# Patient Record
Sex: FEMALE | Race: BLACK OR AFRICAN AMERICAN | NOT HISPANIC OR LATINO | Employment: FULL TIME | URBAN - METROPOLITAN AREA
[De-identification: names, ages, dates, MRNs, and addresses within clinical notes are randomized per-mention and may not be internally consistent; named-entity substitution may affect disease eponyms.]

---

## 2017-04-18 ENCOUNTER — HOSPITAL ENCOUNTER (EMERGENCY)
Facility: OTHER | Age: 37
Discharge: HOME | End: 2017-04-19
Attending: EMERGENCY MEDICINE
Payer: COMMERCIAL

## 2017-04-18 VITALS
SYSTOLIC BLOOD PRESSURE: 141 MMHG | HEART RATE: 90 BPM | DIASTOLIC BLOOD PRESSURE: 83 MMHG | WEIGHT: 237.88 LBS | BODY MASS INDEX: 38.23 KG/M2 | TEMPERATURE: 98 F | OXYGEN SATURATION: 96 % | RESPIRATION RATE: 20 BRPM | HEIGHT: 66 IN

## 2017-04-18 DIAGNOSIS — J02.9 PHARYNGITIS, UNSPECIFIED ETIOLOGY: Primary | ICD-10-CM

## 2017-04-18 LAB
B-HCG UR QL: NEGATIVE
CTP QC/QA: YES
DEPRECATED S PYO AG THROAT QL EIA: NEGATIVE

## 2017-04-18 PROCEDURE — 87081 CULTURE SCREEN ONLY: CPT

## 2017-04-18 PROCEDURE — 81025 URINE PREGNANCY TEST: CPT | Performed by: EMERGENCY MEDICINE

## 2017-04-18 PROCEDURE — 96372 THER/PROPH/DIAG INJ SC/IM: CPT

## 2017-04-18 PROCEDURE — 87880 STREP A ASSAY W/OPTIC: CPT

## 2017-04-18 PROCEDURE — 99283 EMERGENCY DEPT VISIT LOW MDM: CPT | Mod: 25

## 2017-04-18 PROCEDURE — 63600175 PHARM REV CODE 636 W HCPCS: Performed by: PHYSICIAN ASSISTANT

## 2017-04-18 RX ORDER — DEXAMETHASONE SODIUM PHOSPHATE 4 MG/ML
8 INJECTION, SOLUTION INTRA-ARTICULAR; INTRALESIONAL; INTRAMUSCULAR; INTRAVENOUS; SOFT TISSUE
Status: COMPLETED | OUTPATIENT
Start: 2017-04-18 | End: 2017-04-18

## 2017-04-18 RX ORDER — IBUPROFEN 800 MG/1
800 TABLET ORAL EVERY 6 HOURS PRN
Qty: 20 TABLET | Refills: 0 | Status: SHIPPED | OUTPATIENT
Start: 2017-04-18 | End: 2018-03-08

## 2017-04-18 RX ORDER — LISINOPRIL 40 MG/1
40 TABLET ORAL DAILY
COMMUNITY

## 2017-04-18 RX ADMIN — DEXAMETHASONE SODIUM PHOSPHATE 8 MG: 4 INJECTION, SOLUTION INTRAMUSCULAR; INTRAVENOUS at 11:04

## 2017-04-18 NOTE — ED AVS SNAPSHOT
[facility]  [address] [facility] [facility] [address]               Fabienne Santiago   2017 10:53 PM   ED    Description:  Female : 1980   Department:  [facility]           Your Care was Coordinated By:     Provider Role From To    Vannesa Collins MD Attending Provider [phone] --    Shahla Luque PA-C Physician Assistant 17 [phone] --      Reason for Visit     Neck Pain           Diagnoses this Visit        Comments    Pharyngitis, unspecified etiology    -  Primary       ED Disposition     None           To Do List           Follow-up Information     Follow up with Bryce Goodson MD In 2 days.    Specialty:  Family Medicine    Contact information:    [address]  [facility] [id number] [phone]          Follow up with [facility].    Specialty:  Emergency Medicine    Why:  If symptoms worsen    Contact information:    [address] [facility] [facility] [address] [phone]       These Medications        Disp Refills Start End    ibuprofen (ADVIL,MOTRIN) 800 MG tablet 20 tablet 0 2017     Take 1 tablet (800 mg total) by mouth every 6 (six) hours as needed for Pain. - Oral      [facility]     [facility] Nurse Care Line -  Assistance  Unless otherwise directed by your provider, please contact [facility], our nurse care line that is available for  assistance.     Registered nurses in the [facility] provide: appointment scheduling, clinical advisement, health education, and other advisory services.  Call: [phone] (toll free)               Medications           START taking these NEW medications        Refills    ibuprofen (ADVIL,MOTRIN) 800 MG tablet 0    Sig: Take 1 tablet (800 mg total) by mouth every 6 (six) hours as needed for Pain.    Class: Print    Route: Oral      These medications were administered today        Dose Freq     "dexamethasone injection 8 mg 8 mg ED 1 Time    Sig: Inject 2 mLs (8 mg total) into the muscle ED 1 Time.    Class: Normal    Route: Intramuscular      STOP taking these medications     diphenhydrAMINE (SOMINEX) 25 mg tablet Take 25 mg by mouth nightly as needed for Insomnia.           Verify that the below list of medications is an accurate representation of the medications you are currently taking.  If none reported, the list may be blank. If incorrect, please contact your healthcare provider. Carry this list with you in case of emergency.           Current Medications     lisinopril (PRINIVIL,ZESTRIL) 40 MG tablet Take 40 mg by mouth once daily.    ibuprofen (ADVIL,MOTRIN) 800 MG tablet Take 1 tablet (800 mg total) by mouth every 6 (six) hours as needed for Pain.           Clinical Reference Information           Your Vitals Were     BP Pulse Temp Resp Height Weight    141/83 (BP Location: Left arm, Patient Position: Sitting) 90 98.1 °F (36.7 °C) (Oral) 20 5' 6" (1.676 m) 107.9 kg (237 lb 14 oz)    Last Period SpO2 BMI          04/17/2017 96% 38.39 kg/m2        Allergies as of 4/18/2017     No Known Allergies      Immunizations Administered on Date of Encounter - 4/18/2017     None      ED Micro, Lab, POCT     Start Ordered       Status Ordering Provider    04/18/17 2309 04/18/17 2308  Rapid strep screen  STAT      Final result     04/18/17 2309 04/18/17 2309  Strep A culture, throat  Once      In process     04/18/17 2254 04/18/17 2253  POCT urine pregnancy  Once      Final result       ED Imaging Orders     None      Discharge References/Attachments     SORE THROATS, SELF-CARE FOR (ENGLISH)    SORE THROAT, WHEN YOU HAVE A (ENGLISH)    PHARYNGITIS, VIRAL (ENGLISH)      [facility] Sign-Up     Activating your [facility] account is as easy as 1-2-3!     1) Visit [email or web address], select Sign Up Now, enter this activation code and your date of birth, then select Next.  [email or web address]  Expires: 6/2/2017 11:39 PM  "     2) Create a username and password to use when you visit [facility] in the future and select a security question in case you lose your password and select Next.    3) Enter your e-mail address and click Sign Up!    Additional Information  If you have questions, please e-mail [email or web address] or call [phone] to talk to our [facility] staff. Remember, [facility] is NOT to be used for urgent needs. For medical emergencies, dial 911.         Smoking Cessation     If you would like to quit smoking:   You may be eligible for free services if you are a [place] resident and started smoking cigarettes before September 1, 1988.  Call the Smoking Cessation Trust ([facility]) toll free at [phone] or ([phone] [phone].   Call 5-800-QUIT-NOW if you do not meet the above criteria.   Contact us via email: [email or web address]   View our website for more information: [email or web address]         [facility] complies with applicable Federal civil rights laws and does not discriminate on the basis of race, color, national origin, age, disability, or sex.        Language Assistance Services     ATTENTION: Language assistance services are available, free of charge. Please call [phone].      ATENCIÓN: Si habla español, tiene a phillips disposición servicios gratuitos de asistencia lingüística. Llame al [phone].     CHÚ Ý: N?u b?n nói Ti?ng Vi?t, có các d?ch v? h? tr? ngôn ng? mi?n phí dành cho b?n. G?i s? [phone].

## 2017-04-19 NOTE — ED PROVIDER NOTES
Encounter Date: 2017       History     Chief Complaint   Patient presents with    Neck Pain     front and sides since yesterday, discomfort when swallowing, earaches 5 days ago     Review of patient's allergies indicates:  No Known Allergies  HPI Comments: Patient is a 36 y.o. female with a past medical history of hypertension, presenting to the emergency department with complaints of neck and rib pain.  The patient reports her symptoms started yesterday and have been persistent since.  She states her pain worsens with swallowing and movement.  She denies difficulty swallowing or handling oral secretions, denies fever, chills, nausea, vomiting.  She reports she has tried taking ibuprofen 800 mg, for septic, and spray for her symptoms with no relief.  She states her pain as a 7/10.  She also reports that she has been treated for an otitis externa.     The history is provided by the patient.     Past Medical History:   Diagnosis Date    Hypertension      Past Surgical History:   Procedure Laterality Date     SECTION, CLASSIC      TONSILLECTOMY       History reviewed. No pertinent family history.  Social History   Substance Use Topics    Smoking status: Current Every Day Smoker     Packs/day: 1.00     Types: Cigarettes    Smokeless tobacco: None    Alcohol use Yes      Comment: occas     Review of Systems   Constitutional: Negative for activity change, appetite change, chills, fatigue and fever.   HENT: Positive for sore throat. Negative for congestion and rhinorrhea.    Eyes: Negative for photophobia and visual disturbance.   Respiratory: Negative for cough, shortness of breath and wheezing.    Cardiovascular: Negative for chest pain.   Gastrointestinal: Negative for abdominal pain, diarrhea, nausea and vomiting.   Genitourinary: Negative for dysuria, hematuria and urgency.   Musculoskeletal: Positive for neck pain. Negative for back pain and myalgias.   Skin: Negative for color change and wound.    Neurological: Negative for weakness and headaches.   Psychiatric/Behavioral: Negative for agitation and confusion.       Physical Exam   Initial Vitals   BP Pulse Resp Temp SpO2   04/18/17 2250 04/18/17 2250 04/18/17 2250 04/18/17 2250 04/18/17 2250   141/83 90 20 98.1 °F (36.7 °C) 96 %     Physical Exam    Nursing note and vitals reviewed.  Constitutional: She appears well-developed and well-nourished. She is not diaphoretic.  Non-toxic appearance. She does not have a sickly appearance. She does not appear ill. No distress.   Well appearing, -American female accompanied by her son in the emergency department.  She is speaking in clear and full sentences, moving all studies.   HENT:   Head: Normocephalic and atraumatic.   Right Ear: External ear normal.   Left Ear: External ear normal.   Nose: Nose normal.   Mouth/Throat: Uvula is midline, oropharynx is clear and moist and mucous membranes are normal. No trismus in the jaw. No uvula swelling.   Submandibular tenderness to palpation with no edema, no lingual elevation, no trismus.  Swallowing and handling oral secretions without difficulty.  Speaking in clear and full sentences.   Eyes: Conjunctivae and EOM are normal.   Neck: Normal range of motion and full passive range of motion without pain. Neck supple.       Cardiovascular: Normal rate, regular rhythm and normal heart sounds.   Pulmonary/Chest: Breath sounds normal. No respiratory distress. She has no wheezes.   Musculoskeletal: Normal range of motion.   Neurological: She is alert and oriented to person, place, and time. GCS eye subscore is 4. GCS verbal subscore is 5. GCS motor subscore is 6.   Skin: Skin is warm.   Psychiatric: She has a normal mood and affect. Her behavior is normal. Judgment and thought content normal.         ED Course   Procedures  Labs Reviewed   THROAT SCREEN, RAPID   CULTURE, STREP A,  THROAT   POCT URINE PREGNANCY             Medical Decision Making:   Clinical Tests:   Lab  Tests: Ordered and Reviewed       <> Summary of Lab: Rapid strep  Other:   I have discussed this case with another health care provider.       <> Summary of the Discussion: Dr. Collins  This note was created using Dragon Medical Dictation. There may be typographical errors secondary to dictation.     Urgent evaluation of a 36 y.o. female with a past medical history of HTN, presenting to the emergency department complaining of neck and throat pain x2 days. Patient is afebrile, nontoxic appearing and hemodynamically stable.  Physical exam reveals regular rate and rhythm, lungs are clear to auscultation bilaterally.  No posterior oropharyngeal erythema or edema.  No submandibular edema, tenderness noted.  No lingual elevation or trismus.  The patient has normal phonation with no trismus to suggest retropharyngeal abscess. There is no hoarseness, difficulty handling secretions, facial swelling, or meningismus to suggest peritonsillar abscess, retropharyngeal abscess, epiglottitis, meningitis, or airway compromise. Will obtain rapid strep, decadron and reassess.  Rapid strep is negative. Patient will be discharged home with a prescription for ibuprofen, counseled on further symptomatic care and treatment. The patient was instructed to follow up with a primary care provider in 2 days or to return to the emergency department for worsening symptoms. The treatment plan was discussed with the patient who demonstrated understanding and comfort with plan. The patient's history, physical exam, and plan of care was discussed with and agreed upon with my supervising physician.     Past Medical History:   Diagnosis Date    Hypertension                      ED Course     Clinical Impression:     1. Pharyngitis, unspecified etiology       Disposition:   Disposition: Discharged  Condition: Stable       Shahla Luque PA-C  04/18/17 3165

## 2017-04-19 NOTE — ED NOTES
Pt c/o neck pain, the front and sides x 2 days. Pt is taking abx for earaches. NO trauma. Pt states she has some discomfort when swallowing. NO trauma. Pt is A & O x 3, denies SOB, fever, chills and N/V/D. Skin is warm and dry w/ pink mucosa. VSS. LONNIE x 3mm. BBS- CTA. Abd- SNT. PSM x 4 exts.

## 2017-04-21 LAB — BACTERIA THROAT CULT: NORMAL

## 2018-03-08 ENCOUNTER — HOSPITAL ENCOUNTER (EMERGENCY)
Facility: OTHER | Age: 38
Discharge: HOME | End: 2018-03-08
Attending: EMERGENCY MEDICINE
Payer: COMMERCIAL

## 2018-03-08 VITALS
WEIGHT: 227 LBS | SYSTOLIC BLOOD PRESSURE: 147 MMHG | TEMPERATURE: 98 F | HEIGHT: 66 IN | BODY MASS INDEX: 36.48 KG/M2 | OXYGEN SATURATION: 99 % | HEART RATE: 80 BPM | RESPIRATION RATE: 15 BRPM | DIASTOLIC BLOOD PRESSURE: 77 MMHG

## 2018-03-08 DIAGNOSIS — T14.90XA INJURY: ICD-10-CM

## 2018-03-08 DIAGNOSIS — S93.601A RIGHT FOOT SPRAIN, INITIAL ENCOUNTER: Primary | ICD-10-CM

## 2018-03-08 LAB
B-HCG UR QL: NEGATIVE
CTP QC/QA: YES

## 2018-03-08 PROCEDURE — 81025 URINE PREGNANCY TEST: CPT | Performed by: EMERGENCY MEDICINE

## 2018-03-08 PROCEDURE — 99284 EMERGENCY DEPT VISIT MOD MDM: CPT | Mod: 25

## 2018-03-08 RX ORDER — IBUPROFEN 800 MG/1
800 TABLET ORAL EVERY 6 HOURS PRN
Qty: 30 TABLET | Refills: 0 | OUTPATIENT
Start: 2018-03-08 | End: 2018-11-06

## 2018-03-08 RX ORDER — METHOCARBAMOL 500 MG/1
1000 TABLET, FILM COATED ORAL 3 TIMES DAILY PRN
Qty: 20 TABLET | Refills: 0 | Status: SHIPPED | OUTPATIENT
Start: 2018-03-08 | End: 2018-03-13

## 2018-03-08 NOTE — ED TRIAGE NOTES
Pt c/o right lateral foot pain since yesterday. She denies any trauma. Also denies taking any meds for the pain

## 2018-03-08 NOTE — ED NOTES
Patient Identifiers for Fabienne Santiago checked and correct  LOC: The patient is awake, alert and aware of environment with an appropriate affect, the patient is oriented x 3 and speaking appropriate.  APPEARANCE: Patient resting comfortably and in no acute distress. The patient is clean and well groomed. The patient's clothing is properly fastened.  SKIN: The skin is warm and dry. The patient has normal skin turgor and moist mucus membranes. No rashes or lesions upon observation. Skin Intact , no breakdown noted.  Musculoskeletal :  Normal range of motion noted. Moves all extremities well, right side foot pain  RESPIRATORY: Airway is open and patent, respirations are spontaneous, patient has a normal effort and rate. Breath sounds are clear & equal, bilaterally.  CARDIAC: Patient has a normal rate and rhythm, no peripheral edema noted, capillary refill < 3 seconds.   ABDOMEN: Soft and non tender to palpation, no distention observed. Bowels Sounds are WNL all quads.  PULSES: 2+ radial & pedal pulses, symmetrical in all extremities.  NEUROLOGIC: PERRL, Pupils 3mm and reacts briskly to light. Motor strength 5/5 all extremities.  The pt's facial expression is symmetrical, patient moving all extremities, normal sensation in all extremities when touched with a finger.The patient is awake, alert and cooperative with a calm affect, patient is aware of environment.      Will continue to monitor

## 2018-03-08 NOTE — ED PROVIDER NOTES
"Encounter Date: 3/8/2018    SCRIBE #1 NOTE: I, Christal Nevarez, am scribing for, and in the presence of, Dr. John.       History     Chief Complaint   Patient presents with    Foot Pain     pt complaining of right foot pain.  denies any trauma.  no swelling noted.       Time seen by provider: 5:35 PM    This is a 37 y.o. female, with history of hypertension, who presents with complaint of right foot pain which started a couple of days ago. Patient describes foot pain as constant and located on the lateral surface of the foot. She denies numbness, weakness, tingling, or ankle pain. She denies direct trauma to the foot but notes that one day prior to onset of pain someone stepped on the back of her shoe, causing her body to "lean forward" while her foot was in her shoe. Foot pain is exacerbated with ambulation. She states that foot pain was unchanged by pain medication she is currently taking for a "shoulder problem." Patient has no additional complaints at this time.      The history is provided by the patient.     Review of patient's allergies indicates:  No Known Allergies  Past Medical History:   Diagnosis Date    Hypertension      Past Surgical History:   Procedure Laterality Date     SECTION, CLASSIC      TONSILLECTOMY       No family history on file.  Social History   Substance Use Topics    Smoking status: Current Every Day Smoker     Packs/day: 1.00     Types: Cigarettes    Smokeless tobacco: Not on file    Alcohol use Yes      Comment: occas     Review of Systems   Constitutional: Negative for chills and fever.   HENT: Negative for congestion and sore throat.    Respiratory: Negative for cough and shortness of breath.    Cardiovascular: Negative for chest pain.   Gastrointestinal: Negative for abdominal pain, constipation, diarrhea, nausea and vomiting.   Genitourinary: Negative for dysuria.   Musculoskeletal: Negative for back pain.        Positive for right foot pain.   Skin: Negative for rash " and wound.   Neurological: Negative for weakness and numbness.   Hematological: Does not bruise/bleed easily.       Physical Exam     Initial Vitals [03/08/18 1714]   BP Pulse Resp Temp SpO2   (!) 141/79 99 15 98.8 °F (37.1 °C) 99 %      MAP       99.67         Physical Exam    Nursing note and vitals reviewed.  Constitutional: She appears well-developed and well-nourished. She is not diaphoretic. No distress.   HENT:   Head: Normocephalic and atraumatic.   Eyes: EOM are normal. Pupils are equal, round, and reactive to light. Right eye exhibits no discharge. Left eye exhibits no discharge. No scleral icterus.   No pallor or scleral icterus.   Neck: Normal range of motion. Neck supple.   Cardiovascular: Intact distal pulses.   Pulmonary/Chest: No respiratory distress.   Musculoskeletal: Normal range of motion. She exhibits tenderness. She exhibits no edema.   Tenderness to palpation to the right lateral foot over region of proximal 5th metatarsal. No tenderness to malleoli, Carolin's tendon, plantar region, or proximal fibula.    Neurological: She is alert and oriented to person, place, and time.   NVID.   Skin: Skin is warm and dry. Capillary refill takes less than 2 seconds. No rash and no abscess noted. No erythema. No pallor.   Psychiatric: She has a normal mood and affect. Her behavior is normal. Judgment and thought content normal.         ED Course   Procedures  Labs Reviewed   POCT URINE PREGNANCY     Imaging Results          X-Ray Foot Complete Right (Final result)  Result time 03/08/18 18:20:35    Final result by Issac Sykes MD (03/08/18 18:20:35)                 Impression:       No acute process.              Electronically signed by: ISSAC SYKES MD  Date:     03/08/18  Time:    18:20              Narrative:    Exam: [id number]  03/08/18  17:52:08 [id number] ([facility]) : XR FOOT COMPLETE 3 VIEW RIGHT    Technique:    3 x-ray views of the right foot.    Comparison:    02/22/2016    Findings:      The bone  mineralization is within normal limits.  The joint spaces are maintained.  The Lisfranc articulation is within normal limits.  The soft tissues are unremarkable.  There is no evidence of a fracture or dislocation of the right foot.                                   X-Rays:   Independently Interpreted Readings:   Other Readings:  R foot- No fracture or dislocation.    Medical Decision Making:   Independently Interpreted Test(s):   I have ordered and independently interpreted X-rays - see prior notes.  Clinical Tests:   Lab Tests: Ordered and Reviewed  Radiological Study: Ordered and Reviewed            Scribe Attestation:   Scribe #1: I performed the above scribed service and the documentation accurately describes the services I performed. I attest to the accuracy of the note.    Attending Attestation:           Physician Attestation for Scribe:  Physician Attestation Statement for Scribe #1: I, Dr. John, reviewed documentation, as scribed by Christal Nevarez in my presence, and it is both accurate and complete.          patient presents complaining of a genetic right foot pain.  She does a lot of walking she also reports catching her foot on something and hyperextending and in the plantar direction.  Did not think much of it.  No other trauma.  On exam she has tenderness over the fifth metatarsal x-ray however does not show fracture in this region or otherwise will treat with anti-inflammatory, muscle relaxant.  The patient relates that she had similar symptoms and received relief with a intramuscular shot several years ago chart review shows this was anti-inflammatory and Decadron however patient is already on steroid pack for her shoulder.  Stable for discharge.  Encouraged follow-up with primary care and/or/her orthopedist, especially if symptoms persist.           Clinical Impression:     1. Right foot sprain, initial encounter    2. Injury                                 Calderon John II, MD  03/09/18 1300

## 2018-11-06 ENCOUNTER — HOSPITAL ENCOUNTER (EMERGENCY)
Facility: OTHER | Age: 38
Discharge: HOME | End: 2018-11-06
Attending: EMERGENCY MEDICINE
Payer: COMMERCIAL

## 2018-11-06 VITALS
OXYGEN SATURATION: 99 % | HEART RATE: 82 BPM | HEIGHT: 67 IN | RESPIRATION RATE: 17 BRPM | WEIGHT: 227 LBS | BODY MASS INDEX: 35.63 KG/M2 | DIASTOLIC BLOOD PRESSURE: 79 MMHG | SYSTOLIC BLOOD PRESSURE: 129 MMHG | TEMPERATURE: 98 F

## 2018-11-06 DIAGNOSIS — R06.02 SOB (SHORTNESS OF BREATH): ICD-10-CM

## 2018-11-06 DIAGNOSIS — M54.9 MID BACK PAIN ON LEFT SIDE: Primary | ICD-10-CM

## 2018-11-06 DIAGNOSIS — Z72.0 TOBACCO USE: ICD-10-CM

## 2018-11-06 LAB
B-HCG UR QL: NEGATIVE
BILIRUB UR QL STRIP: NEGATIVE
CLARITY UR: CLEAR
COLOR UR: YELLOW
CTP QC/QA: YES
GLUCOSE UR QL STRIP: NEGATIVE
HGB UR QL STRIP: NEGATIVE
KETONES UR QL STRIP: NEGATIVE
LEUKOCYTE ESTERASE UR QL STRIP: NEGATIVE
NITRITE UR QL STRIP: NEGATIVE
PH UR STRIP: 6 [PH] (ref 5–8)
PROT UR QL STRIP: NEGATIVE
SP GR UR STRIP: 1.01 (ref 1–1.03)
URN SPEC COLLECT METH UR: NORMAL
UROBILINOGEN UR STRIP-ACNC: NEGATIVE EU/DL

## 2018-11-06 PROCEDURE — 81025 URINE PREGNANCY TEST: CPT | Performed by: EMERGENCY MEDICINE

## 2018-11-06 PROCEDURE — 99284 EMERGENCY DEPT VISIT MOD MDM: CPT | Mod: 25

## 2018-11-06 PROCEDURE — 96372 THER/PROPH/DIAG INJ SC/IM: CPT

## 2018-11-06 PROCEDURE — 25000003 PHARM REV CODE 250: Performed by: PHYSICIAN ASSISTANT

## 2018-11-06 PROCEDURE — 63600175 PHARM REV CODE 636 W HCPCS: Performed by: PHYSICIAN ASSISTANT

## 2018-11-06 PROCEDURE — 81003 URINALYSIS AUTO W/O SCOPE: CPT

## 2018-11-06 RX ORDER — OXAPROZIN 600 MG/1
600 TABLET, FILM COATED ORAL 2 TIMES DAILY PRN
Qty: 20 TABLET | Refills: 0 | Status: SHIPPED | OUTPATIENT
Start: 2018-11-06

## 2018-11-06 RX ORDER — VALACYCLOVIR HYDROCHLORIDE 500 MG/1
500 TABLET, FILM COATED ORAL 2 TIMES DAILY
COMMUNITY

## 2018-11-06 RX ORDER — CYCLOBENZAPRINE HCL 10 MG
10 TABLET ORAL 3 TIMES DAILY PRN
Qty: 15 TABLET | Refills: 0 | Status: SHIPPED | OUTPATIENT
Start: 2018-11-06 | End: 2018-11-11

## 2018-11-06 RX ORDER — KETOROLAC TROMETHAMINE 30 MG/ML
30 INJECTION, SOLUTION INTRAMUSCULAR; INTRAVENOUS
Status: COMPLETED | OUTPATIENT
Start: 2018-11-06 | End: 2018-11-06

## 2018-11-06 RX ORDER — CYCLOBENZAPRINE HCL 10 MG
10 TABLET ORAL
Status: COMPLETED | OUTPATIENT
Start: 2018-11-06 | End: 2018-11-06

## 2018-11-06 RX ADMIN — CYCLOBENZAPRINE HYDROCHLORIDE 10 MG: 10 TABLET, FILM COATED ORAL at 03:11

## 2018-11-06 RX ADMIN — KETOROLAC TROMETHAMINE 30 MG: 30 INJECTION, SOLUTION INTRAMUSCULAR at 03:11

## 2018-11-06 NOTE — ED TRIAGE NOTES
"Pt reports L sided back pain near her ribs starting Sunday evening. "When the pain comes, my chest will feel like it's going in and it's hard to breathe." Denies any recent injury r heavy lifting. Pt also reports increased belching.   "

## 2018-11-06 NOTE — ED PROVIDER NOTES
Encounter Date: 2018       History     Chief Complaint   Patient presents with    rib pain     Pt c/o right mid back and rib pain that worsens with inspiration and with exertion. Pt states she feels she is unable to take a deep breath because of the pain. Denies cough.      38-year-old female who is a current everyday smoker with history of hypertension presents emergency department with complaints of left-sided back/flank pain. She states that been present since .  She denies any known trauma or injury, fever chills.  She does report chronic cough secondary to smoking denies any chest pain or shortness of breath.  She denies any urinary symptoms.  She reports the pain is worse when she takes a deep breath. She states the pain is an 8/10.  No current treatment at home.  She does report that she had a history of a shoulder injury earlier this year.  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  She states that this was done in July. She states that she is unsure if this pain is secondary to muscle spasm or related to this chronic issue.      The history is provided by the patient.     Review of patient's allergies indicates:  No Known Allergies  Past Medical History:   Diagnosis Date    Hypertension      Past Surgical History:   Procedure Laterality Date     SECTION, CLASSIC      TONSILLECTOMY       No family history on file.  Social History     Tobacco Use    Smoking status: Current Every Day Smoker     Packs/day: 1.00     Types: Cigarettes   Substance Use Topics    Alcohol use: Yes     Comment: occas    Drug use: No     Review of Systems   Constitutional: Negative for chills and fever.   HENT: Negative for sore throat.    Respiratory: Positive for cough (chronic). Negative for  chest tightness, shortness of breath and wheezing.    Cardiovascular: Negative for chest pain, palpitations and leg swelling.   Gastrointestinal: Negative for nausea and vomiting.   Genitourinary: Positive for flank pain. Negative for difficulty urinating, dysuria, frequency, hematuria and urgency.   Musculoskeletal: Positive for back pain. Negative for neck pain and neck stiffness.   Skin: Negative for rash.   Neurological: Negative for weakness and numbness.   Hematological: Does not bruise/bleed easily.       Physical Exam     Initial Vitals [11/06/18 1251]   BP Pulse Resp Temp SpO2   135/80 82 18 98.2 °F (36.8 °C) 98 %      MAP       --         Physical Exam    Nursing note and vitals reviewed.  Constitutional: Vital signs are normal. She appears well-developed and well-nourished. She is not diaphoretic.  Non-toxic appearance. No distress.   HENT:   Head: Normocephalic and atraumatic.   Right Ear: External ear normal.   Left Ear: External ear normal.   Nose: Nose normal.   Mouth/Throat: Oropharynx is clear and moist.   Eyes: Conjunctivae, EOM and lids are normal. Pupils are equal, round, and reactive to light. No scleral icterus.   Neck: Normal range of motion and phonation normal. Neck supple. No spinous process tenderness and no muscular tenderness present. Normal range of motion present. No neck rigidity.   Cardiovascular: Normal rate, regular rhythm, normal heart sounds and intact distal pulses. Exam reveals no gallop and no friction rub.    No murmur heard.  Pulmonary/Chest: Effort normal and breath sounds normal. No respiratory distress. She has no decreased breath sounds. She has no wheezes. She has no rhonchi. She has no rales. She exhibits no tenderness.   Abdominal: Normal appearance. There is no CVA tenderness.   Musculoskeletal: Normal range of motion.        Arms:  No obvious deformities, moving all extremities, normal gait  No midline TTP or step offs to cervical, thoracic or lumbar spine. No  paraspinal muscle TTP. FROM of spine without discomfort or pain. No signs of trauma or injury.   Pain is not reproducible with palpation. She does report worsening pain when she goes from lying to sitting the.  She does report pain also when taking a deep breath. No rashes. Strength 5/5 bilaterally UEs and LEs   Neurological: She is alert and oriented to person, place, and time. She displays no atrophy. No sensory deficit. She exhibits normal muscle tone. GCS eye subscore is 4. GCS verbal subscore is 5. GCS motor subscore is 6.   Skin: Skin is warm, dry and intact. No abrasion, no bruising, no ecchymosis, no laceration, no lesion and no rash noted. No erythema.   Psychiatric: She has a normal mood and affect. Her speech is normal and behavior is normal. Judgment normal. Cognition and memory are normal.         ED Course   Procedures  Labs Reviewed   URINALYSIS, REFLEX TO URINE CULTURE    Narrative:     Preferred Collection Type->Urine, Clean Catch   POCT URINE PREGNANCY          Imaging Results          X-Ray Chest PA And Lateral (Final result)  Result time 11/06/18 14:54:02    Final result by Justice Santiago MD (11/06/18 14:54:02)                 Impression:      No acute abnormality.      Electronically signed by: Justice Santiago MD  Date:    11/06/2018  Time:    14:54             Narrative:    EXAMINATION:  XR CHEST PA AND LATERAL    CLINICAL HISTORY:  Shortness of breath    TECHNIQUE:  PA and lateral views of the chest were performed.    COMPARISON:  None    FINDINGS:  The lungs are clear, with normal appearance of pulmonary vasculature and no pleural effusion or pneumothorax.    The cardiac silhouette is normal in size. The hilar and mediastinal contours are unremarkable.    Bones are intact.                                 Medical Decision Making:   History:   Old Medical Records: I decided to obtain old medical records.  Initial Assessment:   38-year-old female with complaints consistent with left-sided  mid back pain. Vital signs stable, afebrile, neurovascularly intact. She is alert and healthy and nontoxic appearing.  She is not distressed.  Exam documented above.  Patient has worsening pain when she moves from lying to sitting however does not reproducible on my exam.  There is no CVA tenderness palpation. Her lungs are clear to auscultation.  She does report being everyday smoker with a chronic cough but denies any new symptoms. She is PERC negative.   Clinical Tests:   Lab Tests: Ordered and Reviewed  Radiological Study: Ordered and Reviewed  ED Management:  UPT, urinalysis and chest x-ray obtained.  UPT is negative. Urinalysis shows no evidence of serious bacterial infection, UTI or pyelonephritis.  Chest x-ray shows no evidence of infiltrate, pleural effusion, mass consolidation, lesion or pneumothorax.  Patient's pain is to the left mid back.  It is not reproducible with palpation.  She does report that is worse when she takes a deep breath. She was administered IM Toradol in the emergency department.  I did discuss results with patient.  She is tearful in stating that she is crying secondary to her pain.  4:00 PM patient was administered PO flexeril in the ED. She stated to the nurse that she was ready to be discharged home because she wanted to go to [facility] for an MRI. She stated to the nurse that the flexeril would not do anything. Emergent MRI not indicated at this time based on history and exam. There is no evidence of spinal cord compression or cauda equina syndrome. Will discharge home with Rx Oxaprozin and Flexeril as well as care instructions.  She is urged to discontinue tobacco use.  She is urged close follow up with her PCP in the next 48 hours or return to the ED for any worsening signs or symptoms  Other:   I have discussed this case with another health care provider.       <> Summary of the Discussion: Karina  This note was created using [employer] dictation.  There may  be typographical errors secondary to dictation.      Additional MDM:   Smoking Cessation: The patient is a smoker. The patient was counseled on smoking cessation for: 3 minutes. The patient was counseled on tobacco related  health complications.                    Clinical Impression:     1. Mid back pain on left side    2. SOB (shortness of breath)    3. Tobacco use            Disposition:   Disposition: Discharged  Condition: Stable                        Sapna Landers PA-C  11/06/18 1211

## 2019-03-26 ENCOUNTER — TELEPHONE (OUTPATIENT)
Dept: INTERNAL MEDICINE | Facility: CLINIC | Age: 39
End: 2019-03-26

## 2019-03-26 NOTE — TELEPHONE ENCOUNTER
----- Message from Avani Kumari sent at 3/25/2019  3:00 PM CDT -----  Contact: Self [phone]  Pt is requesting a call back to schedule an physical to establish care. 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    Pt is off of work on April 4,5 and 8.    Pt may be reached at [phone].    Thank you.  LC